# Patient Record
Sex: FEMALE | Race: WHITE | NOT HISPANIC OR LATINO | RURAL
[De-identification: names, ages, dates, MRNs, and addresses within clinical notes are randomized per-mention and may not be internally consistent; named-entity substitution may affect disease eponyms.]

---

## 2018-07-10 PROBLEM — H52.01: Noted: 2018-07-10

## 2018-07-10 PROBLEM — H52.4: Noted: 2018-07-10

## 2021-01-26 ENCOUNTER — PREPPED CHART (OUTPATIENT)
Dept: RURAL CLINIC 3 | Facility: CLINIC | Age: 56
End: 2021-01-26

## 2021-01-26 ENCOUNTER — IMPORTED ENCOUNTER (OUTPATIENT)
Dept: URBAN - NONMETROPOLITAN AREA CLINIC 1 | Facility: CLINIC | Age: 56
End: 2021-01-26

## 2021-01-26 PROCEDURE — S0621 ROUTINE OPHTHALMOLOGICAL EXA: HCPCS

## 2021-01-26 PROCEDURE — 92310 CONTACT LENS FITTING OU: CPT

## 2021-01-26 NOTE — PATIENT DISCUSSION
Hyperopia OU-Discussed diagnosis with patient. Presbyopia OU-Discussed diagnosis with patient. CL wear-CLs fit and center well.-Stressed that patient should not sleep in CL. -Updated CL Rx given today. -CL care and precautions given. ***********Updated spec Rx given. Recommend lens that will provide comfort as well as protect safety and health of eyes.

## 2021-01-26 NOTE — PATIENT DISCUSSION
CL water-Cls fit and center well.-Stressed that pt should not sleep in CL. -Updated CL Rx given today. -CL care and precautions given.

## 2022-01-31 ASSESSMENT — TONOMETRY
OD_IOP_MMHG: 13
OS_IOP_MMHG: 13

## 2022-01-31 ASSESSMENT — VISUAL ACUITY
OD_CC: 20/20-1
OS_CC: 20/20-2

## 2022-04-09 ASSESSMENT — TONOMETRY
OS_IOP_MMHG: 13
OD_IOP_MMHG: 13

## 2022-04-09 ASSESSMENT — VISUAL ACUITY
OS_SC: 20/20-2
OD_SC: 20/20-1